# Patient Record
Sex: MALE | Race: WHITE | NOT HISPANIC OR LATINO
[De-identification: names, ages, dates, MRNs, and addresses within clinical notes are randomized per-mention and may not be internally consistent; named-entity substitution may affect disease eponyms.]

---

## 2020-03-16 ENCOUNTER — APPOINTMENT (OUTPATIENT)
Dept: CARDIOLOGY | Facility: CLINIC | Age: 45
End: 2020-03-16
Payer: COMMERCIAL

## 2020-03-16 ENCOUNTER — NON-APPOINTMENT (OUTPATIENT)
Age: 45
End: 2020-03-16

## 2020-03-16 VITALS
HEIGHT: 67 IN | BODY MASS INDEX: 26.37 KG/M2 | WEIGHT: 168 LBS | OXYGEN SATURATION: 100 % | SYSTOLIC BLOOD PRESSURE: 136 MMHG | DIASTOLIC BLOOD PRESSURE: 82 MMHG | HEART RATE: 94 BPM

## 2020-03-16 DIAGNOSIS — I15.9 SECONDARY HYPERTENSION, UNSPECIFIED: ICD-10-CM

## 2020-03-16 DIAGNOSIS — Z87.891 PERSONAL HISTORY OF NICOTINE DEPENDENCE: ICD-10-CM

## 2020-03-16 DIAGNOSIS — I10 ESSENTIAL (PRIMARY) HYPERTENSION: ICD-10-CM

## 2020-03-16 DIAGNOSIS — Z82.49 FAMILY HISTORY OF ISCHEMIC HEART DISEASE AND OTHER DISEASES OF THE CIRCULATORY SYSTEM: ICD-10-CM

## 2020-03-16 DIAGNOSIS — R00.2 PALPITATIONS: ICD-10-CM

## 2020-03-16 PROBLEM — Z00.00 ENCOUNTER FOR PREVENTIVE HEALTH EXAMINATION: Status: ACTIVE | Noted: 2020-03-16

## 2020-03-16 PROCEDURE — 0296T: CPT

## 2020-03-16 PROCEDURE — 93000 ELECTROCARDIOGRAM COMPLETE: CPT

## 2020-03-16 PROCEDURE — 99204 OFFICE O/P NEW MOD 45 MIN: CPT | Mod: 25

## 2020-03-16 RX ORDER — LISINOPRIL 10 MG/1
10 TABLET ORAL
Refills: 0 | Status: ACTIVE | COMMUNITY

## 2020-03-16 NOTE — HISTORY OF PRESENT ILLNESS
[FreeTextEntry1] : Pt is a 43 y/o M who is referred here today by their PCP for evaluation.  He has been experiencing palpitations for the past 6 mnths and recently several times per day.  He denies CP, SOB, diaphoresis, dizziness, syncope, LE edema, PND.  \par PCP [facility]\par \par PMH: HTN\par Smoking status: never\par social ETOH\par no drug use\par Current exercise: none\par Daily water intake: 1 gallon\par Daily caffeine intake: 2 cups coffee\par OTC medications: excedrin PRN\par Family hx: HTN\par Previous cardiac testing: TTE  2 years ago "normal"\par Previous hospitalizations: none\par

## 2020-03-16 NOTE — DISCUSSION/SUMMARY
[FreeTextEntry1] : Pt is a 43 y/o M with PMH HTN who p/w palpitations\par Will check transthoracic echocardiogram to evaluate left ventricular function and assess for any structural abnormalities\par check alvarado monitor\par Advised pt to go to the nearest ED if symptoms persist or worsen\par HTN: c/w medication, well controlled\par The described plan was discussed with the pt.  All questions and concerns were addressed to the best of my knowledge.

## 2020-04-01 PROCEDURE — 0298T: CPT

## 2020-04-15 ENCOUNTER — APPOINTMENT (OUTPATIENT)
Dept: CARDIOLOGY | Facility: CLINIC | Age: 45
End: 2020-04-15

## 2024-02-08 ENCOUNTER — APPOINTMENT (OUTPATIENT)
Dept: NEUROSURGERY | Facility: CLINIC | Age: 49
End: 2024-02-08
Payer: COMMERCIAL

## 2024-02-08 VITALS
TEMPERATURE: 98 F | SYSTOLIC BLOOD PRESSURE: 139 MMHG | HEIGHT: 67 IN | BODY MASS INDEX: 27.47 KG/M2 | DIASTOLIC BLOOD PRESSURE: 88 MMHG | WEIGHT: 175 LBS

## 2024-02-08 DIAGNOSIS — Q76.2 CONGENITAL SPONDYLOLISTHESIS: ICD-10-CM

## 2024-02-08 PROCEDURE — 72100 X-RAY EXAM L-S SPINE 2/3 VWS: CPT

## 2024-02-08 PROCEDURE — 99204 OFFICE O/P NEW MOD 45 MIN: CPT

## 2024-02-08 NOTE — REASON FOR VISIT
[New Patient Visit] : a new patient visit [FreeTextEntry1] : Pt 49 y/o M presents in office for a NPA,no other complaints

## 2024-02-08 NOTE — HISTORY OF PRESENT ILLNESS
[FreeTextEntry1] : Back and right leg pain [de-identified] : This is a 48-year-old male with 4 years of severe low back and right leg pain that has been attempted management with epidural injections physical therapy medications without relief in symptoms.  His symptoms are significant worse with walking and can be severe but occurs on a intermittent basis.  He has numbness tingling in his right foot as well as weakness in his right foot he denies any bladder issues.  He is taking pain medication in the past but is no longer on pain medication.  He is over-the-counter anti-inflammatories without relief.  His most recent injection was in February 2024 by pain management [place] and did not have symptomatic relief in fact had worsening symptoms his last MRI was 2020 at [facility]

## 2024-02-08 NOTE — RESULTS/DATA
[FreeTextEntry1] :  xrays of the lumbar spine ap lateral views done in the office today limited to spinal interpretation only show show grade 1 spondylolisthesis at L5-S1 without any other gross abnormalities images reviewed with neurosurgical attending

## 2024-02-22 ENCOUNTER — TRANSCRIPTION ENCOUNTER (OUTPATIENT)
Age: 49
End: 2024-02-22

## 2024-02-22 ENCOUNTER — APPOINTMENT (OUTPATIENT)
Dept: NEUROSURGERY | Facility: CLINIC | Age: 49
End: 2024-02-22
Payer: COMMERCIAL

## 2024-02-22 VITALS — TEMPERATURE: 98.4 F | WEIGHT: 172 LBS | BODY MASS INDEX: 27 KG/M2 | HEIGHT: 67 IN

## 2024-02-22 DIAGNOSIS — M51.26 OTHER INTERVERTEBRAL DISC DISPLACEMENT, LUMBAR REGION: ICD-10-CM

## 2024-02-22 DIAGNOSIS — M43.10 SPONDYLOLISTHESIS, SITE UNSPECIFIED: ICD-10-CM

## 2024-02-22 PROCEDURE — 99213 OFFICE O/P EST LOW 20 MIN: CPT

## 2024-02-22 RX ORDER — TRAMADOL HYDROCHLORIDE 50 MG/1
50 TABLET, COATED ORAL
Qty: 30 | Refills: 0 | Status: ACTIVE | COMMUNITY
Start: 2024-02-22 | End: 1900-01-01

## 2024-02-22 RX ORDER — CELECOXIB 200 MG/1
200 CAPSULE ORAL DAILY
Qty: 30 | Refills: 1 | Status: ACTIVE | COMMUNITY
Start: 2024-02-22 | End: 1900-01-01

## 2024-02-22 NOTE — RESULTS/DATA
[FreeTextEntry1] :  Daniela MRI patient ID [id number] was reviewed in detail showing a grade 1 anterolisthesis of L5-S1 with bilateral L5 spondylolysis and what appears to be a slight retrolisthesis of L3 on 4 more importantly what appears to be his symptomatology is a fairly large disc herniation at L4-5 with a large disc herniation paracentral to the right compressing L4 and likely L5 nerve root.

## 2024-02-22 NOTE — ASSESSMENT
Nii Johnson MA [FreeTextEntry1] :  DIAGNOSIS:  LUMBAR HERNIATED DISK  L45      RIGHT   spondylolisthesis with lysis at L5-S1 with mild foraminal narrowing on the right.  TREATMENT PLAN:  LUMBAR MICRODISKECTOMY  L45   RIGHT      This is a patient with lumbar herniated disk and radiculopathy.  He has a combination of a fairly large herniated disc at L4-5 which I think is causing his symptomatology.  He does have a spondylolysis with grade 1 spondylolisthesis L5-S1 which is radiographically present but probably stable and not causing his acute symptomatology so therefore have not recommended turning this into a lumbar fusion.   I have recommended right L4-5 lumbar microdiskectomy  as a treatment option.  This entails removing the lamina and the medial facet joint along with the underlying hypertrophied ligamentum flavum and retrieving the herniated disk fragment as well as removing any weakened or damaged disk material at this level.  This will allow for a widening of the spinal canal and the neuroforamen at the effected level thus decompressing the nerve root. This should not result in added instability to the spine at this level.  This will not require the need for instrumented stabilization and fusion.  There is a chance that that L5-S1 level will become unstable at some point and require intervention including pedicle screw instrumented fixation and fusion.  We discussed this in detail however rolling going to do the decompression at this point he agrees to this.     Risks and benefits of surgery were described to the patient in detail which include but not excluding those otherwise not mentioned:  coma paralysis, death, stroke, spinal fluid leak, nerve injury, weakness, infection, spinal instability, vascular injury, re-herniation, adjacent segment degeneration and persistent pain.

## 2024-02-22 NOTE — PHYSICAL EXAM
[Straight-Leg Raise Test - Right] : straight leg raise of the right leg was positive [Normal] : normal [Able to toe walk] : the patient was able to toe walk [Intact] : all motor groups within normal limits of strength and tone bilaterally [Able to heel walk] : the patient was able to heel walk

## 2024-02-22 NOTE — REASON FOR VISIT
[Follow-Up: _____] : a [unfilled] follow-up visit [FreeTextEntry1] :  Eulalio is here for a clinical reevaluation and also to review his new MRI imaging.  He has an MRI done at [facility].  His main complaint is excruciating right leg pain consistent with an L5 radiculopathy.  Does not have much axial back pain speak of.  He is done pain management in the form of injections on several occasions with diminishing returns.  Pain management: [facility] [place] [facility] [place] Primary care: [facility]

## 2024-03-06 ENCOUNTER — RESULT REVIEW (OUTPATIENT)
Age: 49
End: 2024-03-06

## 2024-03-06 ENCOUNTER — APPOINTMENT (OUTPATIENT)
Dept: NEUROSURGERY | Facility: HOSPITAL | Age: 49
End: 2024-03-06

## 2024-03-12 RX ORDER — METHOCARBAMOL 750 MG/1
750 TABLET, FILM COATED ORAL 3 TIMES DAILY
Qty: 21 | Refills: 0 | Status: ACTIVE | COMMUNITY
Start: 2024-03-12 | End: 1900-01-01

## 2024-03-13 ENCOUNTER — APPOINTMENT (OUTPATIENT)
Dept: NEUROSURGERY | Facility: CLINIC | Age: 49
End: 2024-03-13
Payer: COMMERCIAL

## 2024-03-13 VITALS — TEMPERATURE: 98 F | SYSTOLIC BLOOD PRESSURE: 123 MMHG | OXYGEN SATURATION: 100 % | DIASTOLIC BLOOD PRESSURE: 86 MMHG

## 2024-03-13 PROCEDURE — 99024 POSTOP FOLLOW-UP VISIT: CPT

## 2024-03-13 NOTE — HISTORY OF PRESENT ILLNESS
[FreeTextEntry1] : 48-year-old male presents to the neurosurgery office for postoperative evaluation.  The patient underwent elective microdiscectomy L4-5 (right) on 3/6/2024 at [facility].  The patient was discharged after the ambulatory procedure and he reports that he is doing well.  He has no new complaints and notes improvement in his preoperative radicular leg symptoms.  The patient does complain of some operative site discomfort stating it is a 5/10.  Wound care instructions and lumbar precautions reviewed with the patient.  Postop meds as needed and as tolerated.  The patient will follow-up in 3 weeks to evaluate if the patient can return to his work as a .  May consider incorporating PT as well at the next office visit.

## 2024-03-13 NOTE — REASON FOR VISIT
[de-identified] : Lumbar microdiskectomy on L5-S1 [de-identified] : 03/06/23 [de-identified] : 7 [de-identified] : Pt 49 y/o M presents in office for a 1-week post-op visit, no other complaints.

## 2024-03-20 RX ORDER — OXYCODONE AND ACETAMINOPHEN 5; 325 MG/1; MG/1
5-325 TABLET ORAL
Qty: 56 | Refills: 0 | Status: ACTIVE | COMMUNITY
Start: 2024-03-12 | End: 1900-01-01

## 2024-03-25 ENCOUNTER — APPOINTMENT (OUTPATIENT)
Dept: NEUROSURGERY | Facility: CLINIC | Age: 49
End: 2024-03-25
Payer: COMMERCIAL

## 2024-03-25 VITALS — SYSTOLIC BLOOD PRESSURE: 128 MMHG | DIASTOLIC BLOOD PRESSURE: 80 MMHG | TEMPERATURE: 98.8 F

## 2024-03-25 PROCEDURE — 99024 POSTOP FOLLOW-UP VISIT: CPT

## 2024-03-25 NOTE — HISTORY OF PRESENT ILLNESS
[FreeTextEntry1] : 48-year-old male presents to the neurosurgery office for postoperative evaluation.  The patient underwent elective lumbar microdiscectomy L4-5 on the right on 3/6/2024 [facility].  The patient reports that he is doing well and has noted improvement in his preoperative lower extremity radicular symptoms.  He does complain of some low back pain, however he is doing well.  The patient wants to return to work as a  where he states he will not do any lifting and will follow the noted lumbar precautions.  He is hoping to return to work on April 4, 2024.

## 2024-03-25 NOTE — PHYSICAL EXAM
[General Appearance - In No Acute Distress] : in no acute distress [General Appearance - Alert] : alert [General Appearance - Well Nourished] : well nourished [General Appearance - Well Developed] : well developed [General Appearance - Well-Appearing] : healthy appearing [] : normal voice and communication [Clean] : clean [Dry] : dry [Intact] : intact [Healing Well] : healing well [No Drainage] : without drainage [FreeTextEntry1] : Lumbar spine [FreeTextEntry6] : No evidence of wound dehiscence [Oriented To Time, Place, And Person] : oriented to person, place, and time [Impaired Insight] : insight and judgment were intact [Mood] : the mood was normal [Affect] : the affect was normal [Memory Recent] : recent memory was not impaired [Memory Remote] : remote memory was not impaired [Person] : oriented to person [Place] : oriented to place [Time] : oriented to time [Motor Tone] : muscle tone was normal in all four extremities [No Muscle Atrophy] : normal bulk in all four extremities [Involuntary Movements] : no involuntary movements were seen [Abnormal Walk] : normal gait [FreeTextEntry8] : The patient ambulates unassisted

## 2024-03-25 NOTE — REASON FOR VISIT
[de-identified] : lumbar microdiscectomy on L5-S1 [de-identified] : 03/06/24 [de-identified] : 3 [de-identified] : Pt 47 y/o M presents in office for a 3 week post op sonia,no other complaints

## 2024-03-25 NOTE — ASSESSMENT
[FreeTextEntry1] : Discussed the history, physical examination findings, and recent imaging with the patient with all questions answered.  Continue lumbar precautions.  The patient would like to return to work and he may as long as he observes his lumbar precautions or any aggravating activity.  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  He is already scheduled to follow-up at the end of April for further evaluation.  Discussed that we may incorporate PT as needed at the next office visit.

## 2024-04-02 RX ORDER — NAPROXEN 500 MG/1
500 TABLET ORAL
Qty: 60 | Refills: 0 | Status: ACTIVE | COMMUNITY
Start: 2024-04-02 | End: 1900-01-01

## 2024-04-24 ENCOUNTER — APPOINTMENT (OUTPATIENT)
Dept: NEUROSURGERY | Facility: CLINIC | Age: 49
End: 2024-04-24
Payer: COMMERCIAL

## 2024-04-24 PROCEDURE — 99024 POSTOP FOLLOW-UP VISIT: CPT

## 2024-04-24 RX ORDER — METHYLPREDNISOLONE 4 MG/1
4 TABLET ORAL
Qty: 1 | Refills: 0 | Status: ACTIVE | COMMUNITY
Start: 2024-04-24 | End: 1900-01-01

## 2024-04-24 NOTE — REASON FOR VISIT
[de-identified] :  lumbar microdiscectomy on L5-S1  [de-identified] : 03/06/24 [de-identified] : 7 [de-identified] : Pt is here for his 7 week post op visit.

## 2024-04-24 NOTE — HISTORY OF PRESENT ILLNESS
[FreeTextEntry1] : 48-year-old male presents to the neurosurgery office for postoperative evaluation.  The patient underwent elective lumbar microdiscectomy L4-5 on the right on 3/6/2024 [facility].  The patient reports that he is doing well and has noted improvement in his preoperative lower extremity radicular symptoms.  He does complain of some low back pain, however he is doing well.  The patient wants to return to work as a  where he states he will not do any lifting and will follow the noted lumbar precautions.  He has returned to work April 4, 2024.  He reports that postoperatively, he was doing well.  No change in activity other than prolonged standing with the patient now notes right leg pain and some radicular symptoms over the past week and a half.  He states that the Celebrex has not been helping with the symptoms.  Patient has no other complaints at present.

## 2024-04-24 NOTE — ASSESSMENT
[FreeTextEntry1] : Discussed the history, physical examination findings, and recent imaging with the patient with all questions answered.  Continue lumbar precautions.  Because of the patient's present right lower extremity symptoms that are unrelieved with the Celebrex medication, the patient has been prescribed a Medrol Dosepak with instructions to discontinue all of anti-inflammatory medications.  Formal physical therapy has been added this started 1 to 2 weeks.  The patient will follow-up in 2 to 4 weeks to note progress with conservative management.

## 2024-04-24 NOTE — PHYSICAL EXAM
[General Appearance - Alert] : alert [General Appearance - In No Acute Distress] : in no acute distress [General Appearance - Well Nourished] : well nourished [General Appearance - Well Developed] : well developed [General Appearance - Well-Appearing] : healthy appearing [] : normal voice and communication [Clean] : clean [Dry] : dry [Healing Well] : healing well [Intact] : intact [No Drainage] : without drainage [FreeTextEntry1] : Lumbar spine [FreeTextEntry6] : No evidence of wound dehiscence [Oriented To Time, Place, And Person] : oriented to person, place, and time [Impaired Insight] : insight and judgment were intact [Affect] : the affect was normal [Mood] : the mood was normal [Memory Recent] : recent memory was not impaired [Memory Remote] : remote memory was not impaired [Person] : oriented to person [Place] : oriented to place [Time] : oriented to time [Motor Tone] : muscle tone was normal in all four extremities [Involuntary Movements] : no involuntary movements were seen [No Muscle Atrophy] : normal bulk in all four extremities [Abnormal Walk] : normal gait [FreeTextEntry8] : The patient ambulates unassisted

## 2024-05-13 ENCOUNTER — TRANSCRIPTION ENCOUNTER (OUTPATIENT)
Age: 49
End: 2024-05-13

## 2024-05-22 ENCOUNTER — APPOINTMENT (OUTPATIENT)
Dept: NEUROSURGERY | Facility: CLINIC | Age: 49
End: 2024-05-22
Payer: COMMERCIAL

## 2024-05-22 VITALS
HEIGHT: 67 IN | BODY MASS INDEX: 27 KG/M2 | SYSTOLIC BLOOD PRESSURE: 136 MMHG | DIASTOLIC BLOOD PRESSURE: 92 MMHG | WEIGHT: 172 LBS

## 2024-05-22 DIAGNOSIS — M54.16 RADICULOPATHY, LUMBAR REGION: ICD-10-CM

## 2024-05-22 DIAGNOSIS — Z98.890 OTHER SPECIFIED POSTPROCEDURAL STATES: ICD-10-CM

## 2024-05-22 PROCEDURE — 99024 POSTOP FOLLOW-UP VISIT: CPT

## 2024-05-22 NOTE — HISTORY OF PRESENT ILLNESS
[FreeTextEntry1] : 48-year-old male presents to the neurosurgery office for postoperative evaluation.  The patient underwent elective lumbar microdiscectomy L4-5 on the right on 3/6/2024 [facility].  The patient was doing well postoperatively for the first 4 to 5 weeks and then began complaining of returning right lower extremity radicular symptoms, despite no change in activity other than returning to work.  He denies any trauma or associated injury.  Rest and modified activity was recommended along with anti-inflammatory medications and physical therapy with no relief.  He is scheduled for an epidural on 5/30/2024.

## 2024-05-22 NOTE — REASON FOR VISIT
[de-identified] : Lumbar microdiscectomy on L5-S1. [de-identified] : 03/06/24 [de-identified] : 10 [de-identified] : Pt is here for his 10 week post op visit.

## 2024-05-22 NOTE — ASSESSMENT
[FreeTextEntry1] : Discussed the history, physical examination findings, and recent imaging with the patient with all questions answered.  Continue lumbar precautions.  The patient is scheduled to have an epidural by the pain management doctors at pain management  [phone] on 5/30/2024.  The patient will contact the office to note progress after the epidural.  If no improvement, an MRI with and without contrast will be ordered and the follow-up will be with the neurosurgery attending.

## 2025-02-27 ENCOUNTER — APPOINTMENT (OUTPATIENT)
Dept: NEUROSURGERY | Facility: CLINIC | Age: 50
End: 2025-02-27
Payer: COMMERCIAL

## 2025-02-27 VITALS
SYSTOLIC BLOOD PRESSURE: 130 MMHG | DIASTOLIC BLOOD PRESSURE: 85 MMHG | HEART RATE: 95 BPM | HEIGHT: 67 IN | BODY MASS INDEX: 27 KG/M2 | OXYGEN SATURATION: 98 % | WEIGHT: 172 LBS

## 2025-02-27 DIAGNOSIS — Z98.890 OTHER SPECIFIED POSTPROCEDURAL STATES: ICD-10-CM

## 2025-02-27 DIAGNOSIS — M54.16 RADICULOPATHY, LUMBAR REGION: ICD-10-CM

## 2025-02-27 PROCEDURE — 99213 OFFICE O/P EST LOW 20 MIN: CPT

## 2025-03-12 DIAGNOSIS — Z98.890 OTHER SPECIFIED POSTPROCEDURAL STATES: ICD-10-CM

## 2025-03-19 ENCOUNTER — TRANSCRIPTION ENCOUNTER (OUTPATIENT)
Age: 50
End: 2025-03-19

## 2025-06-10 ENCOUNTER — APPOINTMENT (OUTPATIENT)
Dept: NEUROSURGERY | Facility: CLINIC | Age: 50
End: 2025-06-10
Payer: COMMERCIAL

## 2025-06-10 VITALS — HEIGHT: 67 IN | BODY MASS INDEX: 26.68 KG/M2 | WEIGHT: 170 LBS

## 2025-06-10 PROCEDURE — 99213 OFFICE O/P EST LOW 20 MIN: CPT
